# Patient Record
Sex: MALE | Race: WHITE | NOT HISPANIC OR LATINO | Employment: OTHER | URBAN - NONMETROPOLITAN AREA
[De-identification: names, ages, dates, MRNs, and addresses within clinical notes are randomized per-mention and may not be internally consistent; named-entity substitution may affect disease eponyms.]

---

## 2017-01-05 ENCOUNTER — OFFICE VISIT (OUTPATIENT)
Dept: OPHTHALMOLOGY | Facility: CLINIC | Age: 62
End: 2017-01-05

## 2017-01-05 DIAGNOSIS — H40.003 BORDERLINE GLAUCOMA, BILATERAL: ICD-10-CM

## 2017-01-05 DIAGNOSIS — H52.203 ASTIGMATISM, BILATERAL: ICD-10-CM

## 2017-01-05 DIAGNOSIS — H52.03 HYPERMETROPIA, BILATERAL: Primary | ICD-10-CM

## 2017-01-05 PROCEDURE — 92004 COMPRE OPH EXAM NEW PT 1/>: CPT | Performed by: OPHTHALMOLOGY

## 2017-01-05 PROCEDURE — 76514 ECHO EXAM OF EYE THICKNESS: CPT | Performed by: OPHTHALMOLOGY

## 2017-01-05 PROCEDURE — 92133 CPTRZD OPH DX IMG PST SGM ON: CPT | Performed by: OPHTHALMOLOGY

## 2017-01-05 NOTE — PROGRESS NOTES
Subjective   Rhett Espinoza is a 61 y.o. male.   Chief Complaint   Patient presents with   • Eye Exam     HPI     Eye Exam   In both eyes.  Characterized as blurry vision and distorted vision.           Comments   PPLOV OU x 3 months, worse past 3 days, no pain or other ocular sys; seen in ER today, Dr Prado, noted to have incr IOP; hx of vertigo, not used meclizine in 3 weeks       Last edited by Barrett Memrbeno MD on 1/5/2017 11:42 AM. (History)          Review of Systems    Objective   Visual Acuity (Snellen - Linear)      Right Left   Dist cc 20/40 20/30 -1   Near cc J10 J10       Correction:  Glasses         Wearing Rx      Sphere Cylinder Axis Add   Right +1.25 +1.00 099 +2.50   Left +1.75 +0.25 090 +2.50           Manifest Refraction      Sphere Cylinder Axis Dist   Right +1.75 +0.50 095 20/25   Left +1.50 Sphere  20/25-            Final Rx      Sphere Cylinder Axis Add   Right +1.75 +0.50 095 +2.50   Left +1.50 Sphere  +2.50            Pupils      Pupils   Right PERRL   Left PERRL           Confrontational Visual Fields     Visual Fields      Left Right   Result Full Full                  Extraocular Movement      Right Left   Result Full Full              Tonometry (Applanation, 11:05 AM)      Right Left   Pressure 25 25              Main Ophthalmology Exam     External Exam      Right Left    External Normal Normal      Slit Lamp Exam      Right Left    Lids/Lashes Normal Normal    Conjunctiva/Sclera White and quiet White and quiet    Cornea Clear Clear    Anterior Chamber Deep and quiet Deep and quiet    Iris Round and reactive Round and reactive    Lens Clear Clear    Vitreous Normal Normal      Fundus Exam      Right Left    Disc Normal Normal    C/D Ratio 0.3 0.3    Macula Normal Normal    Vessels Normal Normal    Periphery Normal Normal            OCT Disc:   OD- normal  OS- normal      Assessment/Plan   Rhett was seen today for eye exam.    Diagnoses and all orders for this  visit:    Hypermetropia, bilateral  Comments:  refractive change    Astigmatism, bilateral    Borderline glaucoma, bilateral  Comments:  low risk    discussed findings, observe IOP  Glasses Rx given per refraction  F/u with Dr Torre prn

## 2017-01-05 NOTE — MR AVS SNAPSHOT
Rhett Espinoza   2017 10:40 AM   Office Visit    Dept Phone:  [phone]   Encounter #:  [id number]    Provider:  Barrett Membreno MD   Department:  [facility] OPHTHALMOLOGY                Your Full Care Plan              Your Updated Medication List          This list is accurate as of: 17 11:48 AM.  Always use your most recent med list.                aspirin 81 MG EC tablet   Take 1 tablet by mouth Daily.               You Were Diagnosed With        Codes Comments    Hypermetropia, bilateral    -  Primary ICD-10-CM: H52.03  ICD-9-CM: 367.0 refractive change    Astigmatism, bilateral     ICD-10-CM: H52.203  ICD-9-CM: 367.20     Borderline glaucoma, bilateral     ICD-10-CM: H40.003  ICD-9-CM: 365.00 low risk      Instructions     None    Patient Instructions History      Upcoming Appointments     Visit Type Date Time Department    OFFICE VISIT 2017 10:40 AM [facility]    OFFICE VISIT 2017  9:00 AM [facility]     [facility] [employer] allows you to send messages to your doctor, view your test results, renew your prescriptions, schedule appointments, and more. To sign up, go to [employer] and click on the Sign Up Now link in the New User? box. Enter your [employer] Activation Code exactly as it appears below along with the last four digits of your Social Security Number and your Date of Birth () to complete the sign-up process. If you do not sign up before the expiration date, you must request a new code.    [employer] Activation Code: [email or web address]  Expires: 2017 11:47 AM    If you have questions, you can email [email or web address] or call [phone] to talk to our [employer] staff. Remember, [employer] is NOT to be used for urgent needs. For medical emergencies, dial 911.               Other Info from Your Visit           Your Appointments     2017  9:00 AM T   Office  Visit with Barrett Membreno MD   [facility] OPHTHALMOLOGY (--)    [address] [place] [address] [address]  [facility] [address] [place] [id number] [phone]           Arrive 15 minutes prior to appointment.              Allergies     Sulfa Antibiotics        Reason for Visit     Eye Exam           Vital Signs     Smoking Status                   Current Every Day Smoker           Problems and Diagnoses Noted     Farsightedness    -  Primary    Astigmatism, bilateral        Borderline glaucoma

## 2018-02-05 ENCOUNTER — TRANSCRIBE ORDERS (OUTPATIENT)
Dept: GENERAL RADIOLOGY | Facility: CLINIC | Age: 63
End: 2018-02-05

## 2018-02-05 DIAGNOSIS — M25.512 LEFT SHOULDER PAIN, UNSPECIFIED CHRONICITY: Primary | ICD-10-CM

## 2018-02-06 ENCOUNTER — HOSPITAL ENCOUNTER (OUTPATIENT)
Dept: GENERAL RADIOLOGY | Facility: HOSPITAL | Age: 63
Discharge: HOME | End: 2018-02-06
Admitting: NURSE PRACTITIONER

## 2018-02-06 DIAGNOSIS — M25.512 LEFT SHOULDER PAIN, UNSPECIFIED CHRONICITY: ICD-10-CM

## 2018-02-06 PROCEDURE — 73030 X-RAY EXAM OF SHOULDER: CPT

## 2018-04-12 ENCOUNTER — TRANSCRIBE ORDERS (OUTPATIENT)
Dept: GENERAL RADIOLOGY | Facility: CLINIC | Age: 63
End: 2018-04-12

## 2018-04-12 DIAGNOSIS — M25.512 LEFT SHOULDER PAIN, UNSPECIFIED CHRONICITY: Primary | ICD-10-CM

## 2018-04-17 ENCOUNTER — TRANSCRIBE ORDERS (OUTPATIENT)
Dept: ORTHOPEDIC SURGERY | Facility: CLINIC | Age: 63
End: 2018-04-17

## 2018-04-17 DIAGNOSIS — G56.02 LEFT CARPAL TUNNEL SYNDROME: Primary | ICD-10-CM

## 2020-09-11 ENCOUNTER — OFFICE VISIT (OUTPATIENT)
Dept: FAMILY MEDICINE CLINIC | Facility: CLINIC | Age: 65
End: 2020-09-11

## 2020-09-11 VITALS — SYSTOLIC BLOOD PRESSURE: 122 MMHG | WEIGHT: 177.4 LBS | BODY MASS INDEX: 29.52 KG/M2 | DIASTOLIC BLOOD PRESSURE: 80 MMHG

## 2020-09-11 DIAGNOSIS — Z23 NEED FOR VACCINATION: ICD-10-CM

## 2020-09-11 DIAGNOSIS — R41.89 COGNITIVE DEFICITS: ICD-10-CM

## 2020-09-11 DIAGNOSIS — M25.511 CHRONIC RIGHT SHOULDER PAIN: Primary | ICD-10-CM

## 2020-09-11 DIAGNOSIS — M75.41 IMPINGEMENT SYNDROME OF SHOULDER, RIGHT: ICD-10-CM

## 2020-09-11 DIAGNOSIS — M75.41 IMPINGEMENT SYNDROME OF SHOULDER, RIGHT: Primary | ICD-10-CM

## 2020-09-11 DIAGNOSIS — G89.29 CHRONIC RIGHT SHOULDER PAIN: Primary | ICD-10-CM

## 2020-09-11 DIAGNOSIS — E78.00 PURE HYPERCHOLESTEROLEMIA: Chronic | ICD-10-CM

## 2020-09-11 DIAGNOSIS — F17.200 TOBACCO DEPENDENCE SYNDROME: Chronic | ICD-10-CM

## 2020-09-11 DIAGNOSIS — Z78.9 ALCOHOL USE: Chronic | ICD-10-CM

## 2020-09-11 PROCEDURE — 99203 OFFICE O/P NEW LOW 30 MIN: CPT | Performed by: FAMILY MEDICINE

## 2020-09-11 PROCEDURE — 90471 IMMUNIZATION ADMIN: CPT | Performed by: FAMILY MEDICINE

## 2020-09-11 PROCEDURE — 90686 IIV4 VACC NO PRSV 0.5 ML IM: CPT | Performed by: FAMILY MEDICINE

## 2020-09-11 RX ORDER — IBUPROFEN 200 MG
200 TABLET ORAL EVERY 6 HOURS PRN
COMMUNITY
End: 2021-02-25 | Stop reason: SDUPTHER

## 2020-09-11 RX ORDER — CYCLOBENZAPRINE HCL 5 MG
5 TABLET ORAL 3 TIMES DAILY PRN
Qty: 30 TABLET | Refills: 1 | Status: SHIPPED | OUTPATIENT
Start: 2020-09-11

## 2020-09-11 NOTE — PROGRESS NOTES
Subjective   Rhett Espinoza is a 65 y.o. male.  Had prolonged absence.  Chief complaint is requesting evaluation chronic shoulder pain right.  Unfortunately through some events is yet to be determined patient's had somewhat change in cognitive behavior severe cognitive deficits in the interim.  Extremely halting speech severe short-term memory issues mild possible a aphasia.  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  Did see a neurologist last year for some sort of chemical toxic exposure but not sure what.  Going to have to get all records before proceeding much further.  States still smokes but does not drink anymore.  Has a history of hyperlipidemia but never returned for follow-up.  [facility] is very vague.    History of Present Illness   HPI    The following portions of the patient's history were reviewed and updated as appropriate: allergies, current medications, past family history, past medical history, past social history, past surgical history and problem list.    Review of Systems  Review of Systems   Constitutional: Negative for activity change, appetite change, fatigue and unexpected weight change.   HENT: Negative for trouble swallowing and voice change.    Eyes: Negative for redness and visual disturbance.   Respiratory: Negative for cough and wheezing.    Cardiovascular: Negative for chest pain and palpitations.   Gastrointestinal: Negative for abdominal pain, constipation, diarrhea, nausea and vomiting.   Genitourinary: Negative for urgency.   Musculoskeletal: Positive for arthralgias. Negative for joint swelling.   Neurological: Negative for syncope and headaches.   Hematological: Negative for adenopathy.   Psychiatric/Behavioral: Positive for confusion and decreased concentration. Negative for sleep disturbance.       Objective   Physical Exam  Physical Exam   Constitutional: He is oriented to person, place, and  time. He appears well-developed.   HENT:   Head: Normocephalic.   Eyes: Pupils are equal, round, and reactive to light.   Neck: Normal range of motion. No thyromegaly present.   Cardiovascular: Normal rate, regular rhythm, normal heart sounds and intact distal pulses. Exam reveals no gallop and no friction rub.   No murmur heard.  Pulmonary/Chest: Breath sounds normal.   Abdominal: Soft. He exhibits no distension and no mass. There is no tenderness.   Musculoskeletal: Normal range of motion.   Right shoulder and digit show decreased range of motion stiffness pain abduction past about 60 degrees locks about 90.   is normal but painful pronator drift is equivocal.  Left side is clear.  Does have a history of bilateral carpal tunnel surgery.  Neck shows normal range of motion.   Neurological: He is alert and oriented to person, place, and time. He has normal strength and normal reflexes. No cranial nerve deficit or sensory deficit. He displays a negative Romberg sign.   Skin: Skin is warm and dry.   Psychiatric: His affect is blunt. His speech is delayed and tangential. He is slowed. He exhibits abnormal recent memory.   Halting speech difficulty keep on track short-term memory issues         Visit Vitals  /80   Wt 80.5 kg (177 lb 6.4 oz)   BMI 29.52 kg/m²     Body mass index is 29.52 kg/m².      Assessment/Plan   Rhett was seen today for establish care.    Diagnoses and all orders for this visit:    Chronic right shoulder pain  -     MRI Shoulder Right Without Contrast; Future  -     cyclobenzaprine (FLEXERIL) 5 MG tablet; Take 1 tablet by mouth 3 (Three) Times a Day As Needed for Muscle Spasms. For shoulder prn    Need for vaccination  -     Fluarix/Fluzone/Afluria Quad>6 Months    Impingement syndrome of shoulder, right  -     MRI Shoulder Right Without Contrast; Future  -     cyclobenzaprine (FLEXERIL) 5 MG tablet; Take 1 tablet by mouth 3 (Three) Times a Day As Needed for Muscle Spasms. For shoulder  prn    Cognitive deficits    Pure hypercholesterolemia    Tobacco dependence syndrome    Alcohol use    For the shoulder heat rest massage stretching short-term medication.   need to know anatomy before proceeding further will get MRI.  We will also try to get records from his neurologist and last hospital visit in [place].  We will follow-up after studies.   on stopping smoking.  3-10m

## 2020-09-15 PROBLEM — I63.512 CEREBROVASCULAR ACCIDENT (CVA) DUE TO OCCLUSION OF LEFT MIDDLE CEREBRAL ARTERY (HCC): Status: ACTIVE | Noted: 2020-09-15

## 2020-09-15 PROBLEM — I63.511 CEREBROVASCULAR ACCIDENT (CVA) DUE TO OCCLUSION OF RIGHT MIDDLE CEREBRAL ARTERY (HCC): Status: ACTIVE | Noted: 2020-09-15

## 2020-10-02 ENCOUNTER — APPOINTMENT (OUTPATIENT)
Dept: MRI IMAGING | Facility: HOSPITAL | Age: 65
End: 2020-10-02

## 2020-10-06 ENCOUNTER — APPOINTMENT (OUTPATIENT)
Dept: MRI IMAGING | Facility: HOSPITAL | Age: 65
End: 2020-10-06

## 2021-02-25 ENCOUNTER — TELEPHONE (OUTPATIENT)
Dept: FAMILY MEDICINE CLINIC | Facility: CLINIC | Age: 66
End: 2021-02-25

## 2021-02-25 RX ORDER — IBUPROFEN 200 MG
200 TABLET ORAL EVERY 6 HOURS PRN
Qty: 100 TABLET | Refills: 1 | Status: SHIPPED | OUTPATIENT
Start: 2021-02-25

## 2021-02-25 NOTE — TELEPHONE ENCOUNTER
Caller: Rhett Espinoza    Relationship: Self    Best call back number: [phone]   What medication are you requesting: ibuprofen 600MG       If a prescription is needed, what is your preferred pharmacy and phone number: [facility] - [address] [place] [address] [phone] [facility] [phone]